# Patient Record
Sex: FEMALE | Race: WHITE | NOT HISPANIC OR LATINO | Employment: OTHER | URBAN - METROPOLITAN AREA
[De-identification: names, ages, dates, MRNs, and addresses within clinical notes are randomized per-mention and may not be internally consistent; named-entity substitution may affect disease eponyms.]

---

## 2019-08-22 NOTE — PATIENT DISCUSSION
cont with prism for reading to help with fusion. Disc that vision therapy is a good option and that this would help with wandering of eye. Disc that she shows no sign of amblyopia today as she is 20/20 in each eye. But, that if muscles worsen we may need to send to [facility] for evaluation. Pt understood and will call for VT with Kiana.

## 2022-04-20 ENCOUNTER — NEW PATIENT (OUTPATIENT)
Dept: URBAN - METROPOLITAN AREA CLINIC 39 | Facility: CLINIC | Age: 67
End: 2022-04-20

## 2022-04-20 DIAGNOSIS — H02.835: ICD-10-CM

## 2022-04-20 DIAGNOSIS — H02.831: ICD-10-CM

## 2022-04-20 DIAGNOSIS — H02.832: ICD-10-CM

## 2022-04-20 DIAGNOSIS — H02.834: ICD-10-CM

## 2022-04-20 PROCEDURE — 99499 UNLISTED E&M SERVICE: CPT

## 2022-04-20 ASSESSMENT — VISUAL ACUITY
OS_CC: 20/40
OD_CC: 20/40+1

## 2023-07-15 NOTE — PATIENT DISCUSSION
Recommend Bilateral lower lid blepharoplasty trans conj laser (discussed risks and benefits of sx. .. ). If you are a smoker, it is important for your health to stop smoking. Please be aware that second hand smoke is also harmful.